# Patient Record
Sex: FEMALE | Race: BLACK OR AFRICAN AMERICAN | NOT HISPANIC OR LATINO | Employment: STUDENT | URBAN - METROPOLITAN AREA
[De-identification: names, ages, dates, MRNs, and addresses within clinical notes are randomized per-mention and may not be internally consistent; named-entity substitution may affect disease eponyms.]

---
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
## 2024-01-03 ENCOUNTER — OFFICE VISIT (OUTPATIENT)
Dept: FAMILY MEDICINE CLINIC | Facility: CLINIC | Age: 19
End: 2024-01-03
Payer: COMMERCIAL

## 2024-01-03 VITALS
HEART RATE: 89 BPM | SYSTOLIC BLOOD PRESSURE: 129 MMHG | OXYGEN SATURATION: 100 % | HEIGHT: 66 IN | BODY MASS INDEX: 37.12 KG/M2 | TEMPERATURE: 97.4 F | WEIGHT: 231 LBS | DIASTOLIC BLOOD PRESSURE: 70 MMHG

## 2024-01-03 DIAGNOSIS — E66.09 OBESITY DUE TO EXCESS CALORIES WITHOUT SERIOUS COMORBIDITY WITH BODY MASS INDEX (BMI) IN 95TH TO 98TH PERCENTILE FOR AGE IN PEDIATRIC PATIENT: ICD-10-CM

## 2024-01-03 DIAGNOSIS — B36.0 TINEA VERSICOLOR: ICD-10-CM

## 2024-01-03 DIAGNOSIS — N91.2 AMENORRHEA: ICD-10-CM

## 2024-01-03 DIAGNOSIS — Z00.00 WELL ADULT EXAM: Primary | ICD-10-CM

## 2024-01-03 PROCEDURE — 99395 PREV VISIT EST AGE 18-39: CPT | Performed by: FAMILY MEDICINE

## 2024-01-03 NOTE — PROGRESS NOTES
"Assessment/Plan: Anticipatory guidance provided.  Recommend lab testing below including thyroid check.  Recommend follow-up with gynecology and dermatology.  Anticipatory guidance provided.  Recommend good diet and regular exercise.     1. Well adult exam  -     CBC and differential  -     Comprehensive metabolic panel  -     Lipid Panel with Direct LDL reflex    2. Amenorrhea  -     Ambulatory Referral to Gynecology; Future  -     TSH, 3rd generation with Free T4 reflex; Future  -     Hemoglobin A1C    3. Tinea versicolor  -     Ambulatory Referral to Dermatology; Future    4. 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 female.    Patient here for well check.  She has a history of amenorrhea.  She would like to see a gynecologist.  Her last thyroid testing was borderline abnormal.  She should have this rechecked.  She also would like a referral to dermatology             The following portions of the patient's history were reviewed and updated as appropriate: allergies, current medications, past family history, past medical history, past social history, past surgical history, and problem list.    Review of Systems   Constitutional: Negative.    HENT: Negative.     Eyes: Negative.    Respiratory: Negative.     Cardiovascular: Negative.    Gastrointestinal: Negative.    Endocrine: Negative.    Genitourinary: Negative.    Musculoskeletal: Negative.    Skin: Negative.    Allergic/Immunologic: Negative.    Neurological: Negative.    Hematological: Negative.    Psychiatric/Behavioral: Negative.           Objective:      /70 (BP Location: Left arm, Patient Position: Sitting, Cuff Size: Large)   Pulse 89   Temp (!) 97.4 °F (36.3 °C) (Tympanic)   Ht 5' 6\" (1.676 m)   Wt 105 kg (231 lb)   LMP 07/18/2023   SpO2 100%   BMI 37.28 kg/m²          Physical " Exam  Vitals reviewed.   Constitutional:       Appearance: She is well-developed.   HENT:      Head: Normocephalic and atraumatic.      Right Ear: External ear normal. Tympanic membrane is not erythematous or bulging.      Left Ear: External ear normal. Tympanic membrane is not erythematous or bulging.      Nose: Nose normal.      Mouth/Throat:      Mouth: No oral lesions.      Pharynx: No oropharyngeal exudate.   Eyes:      General: No scleral icterus.        Right eye: No discharge.         Left eye: No discharge.      Conjunctiva/sclera: Conjunctivae normal.   Neck:      Thyroid: No thyromegaly.   Cardiovascular:      Rate and Rhythm: Normal rate and regular rhythm.      Heart sounds: Normal heart sounds. No murmur heard.     No friction rub. No gallop.   Pulmonary:      Effort: Pulmonary effort is normal. No respiratory distress.      Breath sounds: No wheezing or rales.   Chest:      Chest wall: No tenderness.   Abdominal:      General: Bowel sounds are normal. There is no distension.      Palpations: Abdomen is soft. There is no mass.      Tenderness: There is no abdominal tenderness. There is no guarding or rebound.   Musculoskeletal:         General: No tenderness or deformity. Normal range of motion.      Cervical back: Normal range of motion and neck supple.   Lymphadenopathy:      Cervical: No cervical adenopathy.   Skin:     General: Skin is warm and dry.      Coloration: Skin is not pale.      Findings: No erythema or rash.   Neurological:      Mental Status: She is alert and oriented to person, place, and time.      Cranial Nerves: No cranial nerve deficit.      Motor: No abnormal muscle tone.      Coordination: Coordination normal.      Deep Tendon Reflexes: Reflexes are normal and symmetric.   Psychiatric:         Behavior: Behavior normal.

## 2024-01-12 ENCOUNTER — APPOINTMENT (OUTPATIENT)
Dept: LAB | Age: 19
End: 2024-01-12
Payer: COMMERCIAL

## 2024-01-12 DIAGNOSIS — N91.2 AMENORRHEA: ICD-10-CM

## 2024-01-12 LAB
ALBUMIN SERPL BCP-MCNC: 4.1 G/DL (ref 3.5–5)
ALP SERPL-CCNC: 74 U/L (ref 34–104)
ALT SERPL W P-5'-P-CCNC: 15 U/L (ref 7–52)
ANION GAP SERPL CALCULATED.3IONS-SCNC: 8 MMOL/L
AST SERPL W P-5'-P-CCNC: 19 U/L (ref 13–39)
BASOPHILS # BLD AUTO: 0.07 THOUSANDS/ÂΜL (ref 0–0.1)
BASOPHILS NFR BLD AUTO: 1 % (ref 0–1)
BILIRUB SERPL-MCNC: 0.39 MG/DL (ref 0.2–1)
BUN SERPL-MCNC: 9 MG/DL (ref 5–25)
CALCIUM SERPL-MCNC: 9.6 MG/DL (ref 8.4–10.2)
CHLORIDE SERPL-SCNC: 104 MMOL/L (ref 96–108)
CHOLEST SERPL-MCNC: 165 MG/DL
CO2 SERPL-SCNC: 25 MMOL/L (ref 21–32)
CREAT SERPL-MCNC: 0.68 MG/DL (ref 0.6–1.3)
EOSINOPHIL # BLD AUTO: 0.16 THOUSAND/ÂΜL (ref 0–0.61)
EOSINOPHIL NFR BLD AUTO: 2 % (ref 0–6)
ERYTHROCYTE [DISTWIDTH] IN BLOOD BY AUTOMATED COUNT: 16 % (ref 11.6–15.1)
GFR SERPL CREATININE-BSD FRML MDRD: 127 ML/MIN/1.73SQ M
GLUCOSE P FAST SERPL-MCNC: 82 MG/DL (ref 65–99)
HCT VFR BLD AUTO: 39.1 % (ref 34.8–46.1)
HDLC SERPL-MCNC: 34 MG/DL
HGB BLD-MCNC: 12.2 G/DL (ref 11.5–15.4)
IMM GRANULOCYTES # BLD AUTO: 0.05 THOUSAND/UL (ref 0–0.2)
IMM GRANULOCYTES NFR BLD AUTO: 1 % (ref 0–2)
LDLC SERPL CALC-MCNC: 114 MG/DL (ref 0–100)
LYMPHOCYTES # BLD AUTO: 2.46 THOUSANDS/ÂΜL (ref 0.6–4.47)
LYMPHOCYTES NFR BLD AUTO: 30 % (ref 14–44)
MCH RBC QN AUTO: 24.8 PG (ref 26.8–34.3)
MCHC RBC AUTO-ENTMCNC: 31.2 G/DL (ref 31.4–37.4)
MCV RBC AUTO: 80 FL (ref 82–98)
MONOCYTES # BLD AUTO: 0.55 THOUSAND/ÂΜL (ref 0.17–1.22)
MONOCYTES NFR BLD AUTO: 7 % (ref 4–12)
NEUTROPHILS # BLD AUTO: 4.88 THOUSANDS/ÂΜL (ref 1.85–7.62)
NEUTS SEG NFR BLD AUTO: 59 % (ref 43–75)
NRBC BLD AUTO-RTO: 0 /100 WBCS
PLATELET # BLD AUTO: 409 THOUSANDS/UL (ref 149–390)
PMV BLD AUTO: 10.2 FL (ref 8.9–12.7)
POTASSIUM SERPL-SCNC: 3.9 MMOL/L (ref 3.5–5.3)
PROT SERPL-MCNC: 8 G/DL (ref 6.4–8.4)
RBC # BLD AUTO: 4.91 MILLION/UL (ref 3.81–5.12)
SODIUM SERPL-SCNC: 137 MMOL/L (ref 135–147)
TRIGL SERPL-MCNC: 86 MG/DL
WBC # BLD AUTO: 8.17 THOUSAND/UL (ref 4.31–10.16)

## 2024-01-12 PROCEDURE — 85025 COMPLETE CBC W/AUTO DIFF WBC: CPT | Performed by: FAMILY MEDICINE

## 2024-01-12 PROCEDURE — 84443 ASSAY THYROID STIM HORMONE: CPT

## 2024-01-12 PROCEDURE — 83036 HEMOGLOBIN GLYCOSYLATED A1C: CPT | Performed by: FAMILY MEDICINE

## 2024-01-12 PROCEDURE — 80053 COMPREHEN METABOLIC PANEL: CPT | Performed by: FAMILY MEDICINE

## 2024-01-12 PROCEDURE — 80061 LIPID PANEL: CPT | Performed by: FAMILY MEDICINE

## 2024-01-13 LAB
EST. AVERAGE GLUCOSE BLD GHB EST-MCNC: 120 MG/DL
HBA1C MFR BLD: 5.8 %
T3FREE SERPL-MCNC: 3.41 PG/ML (ref 2.5–3.9)
T4 FREE SERPL-MCNC: 1.06 NG/DL (ref 0.61–1.12)
THYROGLOB AB SERPL-ACNC: <1 IU/ML (ref 0–0.9)
THYROGLOB AB SERPL-ACNC: <1 IU/ML (ref 0–0.9)
THYROGLOB SERPL-MCNC: 7.7 NG/ML (ref 1.5–38.5)
THYROPEROXIDASE AB SERPL-ACNC: 12 IU/ML (ref 0–26)
TSH SERPL DL<=0.05 MIU/L-ACNC: 2.31 UIU/ML (ref 0.45–4.5)

## 2024-03-15 NOTE — PROGRESS NOTES
Assessment/Plan:  Problem List Items Addressed This Visit          Obstetrics/Gynecology    Amenorrhea    Relevant Medications    medroxyPROGESTERone (PROVERA) 10 mg tablet    Other Relevant Orders    hCG, quantitative    DHEA-sulfate    Follicle stimulating hormone    TSH, 3rd generation with Free T4 reflex    Testosterone, free, total    Prolactin    US pelvis transabdominal only    17-Hydroxyprogesterone    POCT urine HCG (Completed)     Other Visit Diagnoses       Secondary oligomenorrhea    -  Primary    Relevant Orders    hCG, quantitative    DHEA-sulfate    Follicle stimulating hormone    TSH, 3rd generation with Free T4 reflex    Testosterone, free, total    Prolactin    US pelvis transabdominal only    17-Hydroxyprogesterone    POCT urine HCG (Completed)           With amenorrhea.  Patient previously prescribed Provera every 3 months as needed.  Advised patient repeat labs and ultrasound to rule out PCOS.  Advised to return in 2 weeks for follow-up.      Subjective   Patient ID: Chanell Smart is a 18 y.o. female.    Patient is here for a problem visit.    Chief Complaint   Patient presents with    Follow-up     Haven't had period in 7 months.      She had regular periods until age 15.  She would only get her periods. She states she would skip a period for up to 6 months.  She was seen here 3 years ago and was given Provera.    She has not gotten had a period since 2023.  She only had 4 periods last year.    Pelvic US in  was normal.  Labs in  normal.    She has had some changes in weight, now up to 10-15 lbs over the last yeat.  She reports no facial hair.  NO acne.  No dark skin around neck or armpit.    She has never been sexually active.        Menstrual History:  OB History          0    Para   0    Term   0       0    AB   0    Living   0         SAB   0    IAB   0    Ectopic   0    Multiple   0    Live Births   0                Menarche age: 10  Patient's last menstrual period  "was 08/15/2023 (exact date).         History reviewed. No pertinent past medical history.    History reviewed. No pertinent surgical history.    Social History     Tobacco Use    Smoking status: Never    Smokeless tobacco: Never   Vaping Use    Vaping status: Never Used   Substance Use Topics    Alcohol use: No    Drug use: No        Allergies   Allergen Reactions    Chocolate - Food Allergy      Breaks out in rash         Current Outpatient Medications:     medroxyPROGESTERone (PROVERA) 10 mg tablet, Take 1 tablet (10 mg total) by mouth daily for 10 days Take if no spontaneous menstrual cycle in 3 months, Disp: 10 tablet, Rfl: 4      Review of Systems   Constitutional: Negative.    HENT: Negative.     Eyes: Negative.    Respiratory: Negative.     Cardiovascular: Negative.    Gastrointestinal: Negative.    Endocrine: Negative.    Genitourinary:         As noted in HPI   Musculoskeletal: Negative.    Skin: Negative.    Allergic/Immunologic: Negative.    Neurological: Negative.    Hematological: Negative.    Psychiatric/Behavioral: Negative.           /62 (BP Location: Right arm, Patient Position: Sitting, Cuff Size: Large)   Pulse 79   Ht 5' 6\" (1.676 m)   Wt 107 kg (236 lb 12.8 oz)   LMP 08/15/2023 (Exact Date)   SpO2 97%   BMI 38.22 kg/m²       Physical Exam  Constitutional:       General: She is not in acute distress.     Appearance: She is well-developed.   Cardiovascular:      Rate and Rhythm: Normal rate.      Pulses: Normal pulses.   Neurological:      Mental Status: She is alert and oriented to person, place, and time.   Skin:     General: Skin is warm and dry.   Psychiatric:         Attention and Perception: Attention normal.         Mood and Affect: Mood normal.         Speech: Speech normal.         Behavior: Behavior normal. Behavior is cooperative.             I have spent a total time of 20 minutes on 03/18/24 in caring for this patient including Diagnostic results, Prognosis, Risks and " benefits of tx options, Instructions for management, Patient and family education, Importance of tx compliance, Risk factor reductions, Impressions, Counseling / Coordination of care, Documenting in the medical record, Reviewing / ordering tests, medicine, procedures  , Obtaining or reviewing history  , and Communicating with other healthcare professionals .       Future Appointments   Date Time Provider Department Center   4/3/2024 10:45 AM Brianna Mg MD [facility]

## 2024-03-18 ENCOUNTER — OFFICE VISIT (OUTPATIENT)
Dept: OBGYN CLINIC | Facility: CLINIC | Age: 19
End: 2024-03-18
Payer: COMMERCIAL

## 2024-03-18 ENCOUNTER — LAB (OUTPATIENT)
Dept: LAB | Facility: CLINIC | Age: 19
End: 2024-03-18
Payer: COMMERCIAL

## 2024-03-18 VITALS
OXYGEN SATURATION: 97 % | BODY MASS INDEX: 38.06 KG/M2 | SYSTOLIC BLOOD PRESSURE: 108 MMHG | HEART RATE: 79 BPM | HEIGHT: 66 IN | DIASTOLIC BLOOD PRESSURE: 62 MMHG | WEIGHT: 236.8 LBS

## 2024-03-18 DIAGNOSIS — N91.4 SECONDARY OLIGOMENORRHEA: Primary | ICD-10-CM

## 2024-03-18 DIAGNOSIS — N91.4 SECONDARY OLIGOMENORRHEA: ICD-10-CM

## 2024-03-18 DIAGNOSIS — N91.2 AMENORRHEA: ICD-10-CM

## 2024-03-18 LAB
B-HCG SERPL-ACNC: 1 MIU/ML (ref 0–5)
FSH SERPL-ACNC: 7.5 MIU/ML
PROLACTIN SERPL-MCNC: 11.82 NG/ML (ref 3.34–26.72)
SL AMB POCT URINE HCG: NEGATIVE
TSH SERPL DL<=0.05 MIU/L-ACNC: 2.25 UIU/ML (ref 0.45–4.5)

## 2024-03-18 PROCEDURE — 36415 COLL VENOUS BLD VENIPUNCTURE: CPT

## 2024-03-18 PROCEDURE — 99213 OFFICE O/P EST LOW 20 MIN: CPT | Performed by: OBSTETRICS & GYNECOLOGY

## 2024-03-18 PROCEDURE — 83001 ASSAY OF GONADOTROPIN (FSH): CPT

## 2024-03-18 PROCEDURE — 84702 CHORIONIC GONADOTROPIN TEST: CPT

## 2024-03-18 PROCEDURE — 84402 ASSAY OF FREE TESTOSTERONE: CPT

## 2024-03-18 PROCEDURE — 84443 ASSAY THYROID STIM HORMONE: CPT

## 2024-03-18 PROCEDURE — 84146 ASSAY OF PROLACTIN: CPT

## 2024-03-18 PROCEDURE — 84403 ASSAY OF TOTAL TESTOSTERONE: CPT

## 2024-03-18 PROCEDURE — 82627 DEHYDROEPIANDROSTERONE: CPT

## 2024-03-18 PROCEDURE — 81025 URINE PREGNANCY TEST: CPT | Performed by: OBSTETRICS & GYNECOLOGY

## 2024-03-18 PROCEDURE — 83498 ASY HYDROXYPROGESTERONE 17-D: CPT

## 2024-03-18 RX ORDER — MEDROXYPROGESTERONE ACETATE 10 MG/1
10 TABLET ORAL DAILY
Qty: 10 TABLET | Refills: 4 | Status: SHIPPED | OUTPATIENT
Start: 2024-03-18 | End: 2024-03-28

## 2024-03-19 LAB
DHEA-S SERPL-MCNC: 161 UG/DL (ref 110–433.2)
TESTOST FREE SERPL-MCNC: 3.3 PG/ML
TESTOST SERPL-MCNC: 47 NG/DL (ref 13–71)

## 2024-03-21 LAB — 17OHP SERPL-MCNC: 28 NG/DL

## 2024-03-28 ENCOUNTER — HOSPITAL ENCOUNTER (OUTPATIENT)
Dept: ULTRASOUND IMAGING | Facility: HOSPITAL | Age: 19
Discharge: HOME | End: 2024-03-28
Attending: OBSTETRICS & GYNECOLOGY
Payer: COMMERCIAL

## 2024-03-28 DIAGNOSIS — N91.4 SECONDARY OLIGOMENORRHEA: ICD-10-CM

## 2024-03-28 DIAGNOSIS — N91.2 AMENORRHEA: ICD-10-CM

## 2024-03-28 PROCEDURE — 76856 US EXAM PELVIC COMPLETE: CPT

## 2024-04-02 NOTE — PROGRESS NOTES
Assessment/Plan:  Problem List Items Addressed This Visit          Obstetrics/Gynecology    Amenorrhea - Primary     Other Visit Diagnoses       Secondary oligomenorrhea        Anovulation                 Patient with anovulatory cycles.  No clear diagnosis of PCOS is no cysts noted on ultrasound although formal reading is pending.  Discussed normal testosterone levels without evidence of other hormonal abnormalities.  She does not currently require contraception.  Discussed with patient cyclic progesterone withdrawal to help prevent endometrial hyperplasia and advised to call if with no menses after this cycle of progesterone.  Follow-up in 1 year for medication check and follow-up of abnormal periods. Discussed with patient blood work results in detail    Subjective   Patient ID: Chanell Smart is a 18 y.o. female.    Patient is here for a ff up/results discussion    Chief Complaint   Patient presents with    Follow-up     Discuss blood work. No concerns     Provera started 3 days ago.      Menstrual History:  OB History          0    Para   0    Term   0       0    AB   0    Living   0         SAB   0    IAB   0    Ectopic   0    Multiple   0    Live Births   0                  Patient's last menstrual period was 08/15/2023 (exact date).         History reviewed. No pertinent past medical history.    History reviewed. No pertinent surgical history.    Social History     Tobacco Use    Smoking status: Never    Smokeless tobacco: Never   Vaping Use    Vaping status: Never Used   Substance Use Topics    Alcohol use: No    Drug use: No        Allergies   Allergen Reactions    Chocolate - Food Allergy      Breaks out in rash         Current Outpatient Medications:     medroxyPROGESTERone (PROVERA) 10 mg tablet, Take 1 tablet (10 mg total) by mouth daily for 10 days Take if no spontaneous menstrual cycle in 3 months, Disp: 10 tablet, Rfl: 4      Review of Systems   Constitutional: Negative.    HENT:  "Negative.     Eyes: Negative.    Respiratory: Negative.     Cardiovascular: Negative.    Gastrointestinal: Negative.    Endocrine: Negative.    Genitourinary:         As noted in HPI   Musculoskeletal: Negative.    Skin: Negative.    Allergic/Immunologic: Negative.    Neurological: Negative.    Hematological: Negative.    Psychiatric/Behavioral: Negative.           /78 (BP Location: Right arm, Patient Position: Sitting, Cuff Size: Adult)   Pulse 78   Ht 5' 6\" (1.676 m)   Wt 103 kg (226 lb 12.8 oz)   LMP 08/15/2023 (Exact Date)   SpO2 98%   BMI 36.61 kg/m²       Physical Exam  Constitutional:       General: She is not in acute distress.     Appearance: She is well-developed.   Cardiovascular:      Rate and Rhythm: Normal rate.      Pulses: Normal pulses.   Neurological:      Mental Status: She is alert and oriented to person, place, and time.   Skin:     General: Skin is warm and dry.   Psychiatric:         Attention and Perception: Attention normal.         Mood and Affect: Mood normal.         Speech: Speech normal.         Behavior: Behavior normal. Behavior is cooperative.             Future Appointments   Date Time Provider Department Center   10/31/2024  4:00 PM Toyin Sprague MD [facility]   4/3/2025  3:15 PM Brianna Mg MD [facility]      "

## 2024-04-03 ENCOUNTER — OFFICE VISIT (OUTPATIENT)
Dept: OBGYN CLINIC | Facility: CLINIC | Age: 19
End: 2024-04-03
Payer: COMMERCIAL

## 2024-04-03 VITALS
OXYGEN SATURATION: 98 % | BODY MASS INDEX: 36.45 KG/M2 | HEART RATE: 78 BPM | DIASTOLIC BLOOD PRESSURE: 78 MMHG | HEIGHT: 66 IN | SYSTOLIC BLOOD PRESSURE: 116 MMHG | WEIGHT: 226.8 LBS

## 2024-04-03 DIAGNOSIS — N91.2 AMENORRHEA: Primary | ICD-10-CM

## 2024-04-03 DIAGNOSIS — N91.4 SECONDARY OLIGOMENORRHEA: ICD-10-CM

## 2024-04-03 DIAGNOSIS — N97.0 ANOVULATION: ICD-10-CM

## 2024-04-03 PROCEDURE — 99213 OFFICE O/P EST LOW 20 MIN: CPT | Performed by: OBSTETRICS & GYNECOLOGY

## 2024-08-07 ENCOUNTER — HOSPITAL ENCOUNTER (EMERGENCY)
Facility: HOSPITAL | Age: 19
Discharge: HOME | End: 2024-08-07
Attending: INTERNAL MEDICINE
Payer: COMMERCIAL

## 2024-08-07 VITALS
OXYGEN SATURATION: 100 % | WEIGHT: 231.7 LBS | RESPIRATION RATE: 18 BRPM | HEART RATE: 93 BPM | TEMPERATURE: 97.8 F | DIASTOLIC BLOOD PRESSURE: 76 MMHG | SYSTOLIC BLOOD PRESSURE: 147 MMHG | BODY MASS INDEX: 37.4 KG/M2

## 2024-08-07 DIAGNOSIS — M79.646 THUMB PAIN: Primary | ICD-10-CM

## 2024-08-07 PROCEDURE — 99282 EMERGENCY DEPT VISIT SF MDM: CPT

## 2024-08-07 PROCEDURE — 99284 EMERGENCY DEPT VISIT MOD MDM: CPT | Performed by: PHYSICIAN ASSISTANT

## 2024-08-07 RX ORDER — NAPROXEN 500 MG/1
500 TABLET ORAL 2 TIMES DAILY PRN
Qty: 20 TABLET | Refills: 0 | Status: SHIPPED | OUTPATIENT
Start: 2024-08-07

## 2024-08-07 NOTE — Clinical Note
Chanell Smart was seen and treated in our emergency department on 8/7/2024.                Diagnosis:     Chanell  .    She may return on this date: 08/11/2024    Light duty, no heavy lifting until pain improves or until cleared.      If you have any questions or concerns, please don't hesitate to call.      Bill Henriquez PA-C    ______________________________           _______________          _______________  Hospital Representative                              Date                                Time

## 2024-08-07 NOTE — ED PROVIDER NOTES
History  Chief Complaint   Patient presents with    Thumb Pain     Pt c/o right thumb pain for 2 days unknown if injury occurred      Chanell is a 17 yo F presenting with pain to base of R thumb for the past two days or so. No specific preceding injury/trauma. Notes the pain worsens with certain movements including both flexion and extension of thumb. No swelling appreciable. No meds PTA for symptoms.       History provided by:  Patient   used: No        Prior to Admission Medications   Prescriptions Last Dose Informant Patient Reported? Taking?   medroxyPROGESTERone (PROVERA) 10 mg tablet   No No   Sig: Take 1 tablet (10 mg total) by mouth daily for 10 days Take if no spontaneous menstrual cycle in 3 months      Facility-Administered Medications: None       History reviewed. No pertinent past medical history.    History reviewed. No pertinent surgical history.    Family History   Problem Relation Age of Onset    No Known Problems Mother     No Known Problems Father     No Known Problems Sister     No Known Problems Brother     No Known Problems Maternal Grandmother     No Known Problems Maternal Grandfather     Heart disease Paternal Grandmother     No Known Problems Paternal Grandfather      I have reviewed and agree with the history as documented.    E-Cigarette/Vaping    E-Cigarette Use Never User      E-Cigarette/Vaping Substances    Nicotine No     THC No     CBD No     Flavoring No     Other No     Unknown No      Social History     Tobacco Use    Smoking status: Never    Smokeless tobacco: Never   Vaping Use    Vaping status: Never Used   Substance Use Topics    Alcohol use: No    Drug use: No       Review of Systems   Constitutional:  Negative for chills and fever.   HENT:  Negative for congestion, rhinorrhea and sore throat.    Eyes:  Negative for pain and visual disturbance.   Respiratory:  Negative for cough, shortness of breath and wheezing.    Cardiovascular:  Negative for chest pain  and palpitations.   Gastrointestinal:  Negative for abdominal pain, nausea and vomiting.   Genitourinary:  Negative for dysuria, frequency and urgency.   Musculoskeletal:  Positive for arthralgias. Negative for back pain, joint swelling, neck pain and neck stiffness.   Skin:  Negative for rash and wound.   Neurological:  Negative for dizziness, weakness, light-headedness and numbness.       Physical Exam  Physical Exam  Constitutional:       General: She is not in acute distress.     Appearance: She is well-developed. She is not diaphoretic.   HENT:      Head: Normocephalic and atraumatic.      Right Ear: External ear normal.      Left Ear: External ear normal.   Eyes:      Extraocular Movements:      Right eye: Normal extraocular motion.      Left eye: Normal extraocular motion.      Conjunctiva/sclera: Conjunctivae normal.      Pupils: Pupils are equal, round, and reactive to light.   Cardiovascular:      Rate and Rhythm: Normal rate and regular rhythm.   Pulmonary:      Effort: Pulmonary effort is normal. No accessory muscle usage or respiratory distress.   Abdominal:      General: Abdomen is flat. There is no distension.   Musculoskeletal:         General: Tenderness present.      Cervical back: Normal range of motion. No rigidity.      Comments: TTP along base of thumb, both over musculature of thenar eminence as well as to extensor tendons of thumb. +Finkelstein's. Also notes pain with passive abduction of thumb however. Intact cap refill/sensation distally.    Skin:     General: Skin is warm and dry.      Capillary Refill: Capillary refill takes less than 2 seconds.      Findings: No erythema or rash.   Neurological:      Mental Status: She is alert and oriented to person, place, and time.      Motor: No abnormal muscle tone.      Coordination: Coordination normal.   Psychiatric:         Behavior: Behavior normal.         Thought Content: Thought content normal.         Judgment: Judgment normal.  "        Vital Signs  ED Triage Vitals [08/07/24 1013]   Temperature Pulse Respirations Blood Pressure SpO2   97.8 °F (36.6 °C) 93 18 147/76 100 %      Temp src Heart Rate Source Patient Position - Orthostatic VS BP Location FiO2 (%)   -- -- Sitting Right arm --      Pain Score       --           Vitals:    08/07/24 1013   BP: 147/76   Pulse: 93   Patient Position - Orthostatic VS: Sitting         Visual Acuity      ED Medications  Medications - No data to display    Diagnostic Studies  Results Reviewed       None                   No orders to display              Procedures  Procedures         ED Course         CRAFFT      Flowsheet Row Most Recent Value   CRAFFT Initial Screen: During the past 12 months, did you:    1. Drink any alcohol (more than a few sips)?  No Filed at: 08/07/2024 1013   2. Smoke any marijuana or hashish No Filed at: 08/07/2024 1013   3. Use anything else to get high? (\"anything else\" includes illegal drugs, over the counter and prescription drugs, and things that you sniff or 'daily')? No Filed at: 08/07/2024 1013                                              Medical Decision Making  Atraumatic pain to base of R thumb over the past two days. No swelling on exam. Pain worse with movements. She has TTP to base of thumb both over thenar eminence but also to extensor tendons of thumb. Ddx including Dequervain's/alternate tendonitis, sprain although unlikely given absence of known trauma. Thumb spica brace applied and well tolerated, will refer for f/u with ortho hand for re-evaluation.     Risk  Prescription drug management.                 Disposition  Final diagnoses:   Thumb pain     Time reflects when diagnosis was documented in both MDM as applicable and the Disposition within this note       Time User Action Codes Description Comment    8/7/2024 11:15 AM Bill Henriquez Add [M79.646] Thumb pain           ED Disposition       ED Disposition   Discharge    Condition   Stable    Date/Time "   Wed Aug 7, 2024 1115    Comment   Chanell Swensongabriele discharge to home/self care.                   Follow-up Information       Follow up With Specialties Details Why Contact Info Additional Information    [facility] Emergency Medicine  If symptoms worsen [address] [facility] [address]  [phone] [facility] Emergency Department, [address], [place], [address]    [facility] [place] [facility] Schedule an appointment as soon as possible for a visit   [address]  [address]  [facility] [address] [phone] [facility] [place], [address], [address], [place], [place], [address] [phone]            Discharge Medication List as of 8/7/2024 11:17 AM        START taking these medications    Details   naproxen (NAPROSYN) 500 mg tablet Take 1 tablet (500 mg total) by mouth 2 (two) times a day as needed for mild pain or moderate pain, Starting Wed 8/7/2024, Normal           CONTINUE these medications which have NOT CHANGED    Details   medroxyPROGESTERone (PROVERA) 10 mg tablet Take 1 tablet (10 mg total) by mouth daily for 10 days Take if no spontaneous menstrual cycle in 3 months, Starting Mon 3/18/2024, Until Thu 3/28/2024, Normal                 PDMP Review       None            ED Provider  Electronically Signed by             Bill Henriquez PA-C  08/07/24 2640

## 2024-08-07 NOTE — DISCHARGE INSTRUCTIONS
Please refer to the attached information for strict return instructions. If symptoms worsen or new symptoms develop please return to the ER. Please follow up with orthopedics if symptoms persist.

## 2024-09-16 ENCOUNTER — TELEPHONE (OUTPATIENT)
Dept: DERMATOLOGY | Facility: CLINIC | Age: 19
End: 2024-09-16

## 2024-09-16 NOTE — TELEPHONE ENCOUNTER
Called patient regarding her appt with Dr. Sprague on 10/31. There was a change to provider's schedule an appt needs to be r/s. Spoke with patient and appt has been rescheduled for 12/17 @ 4:00 with Dr. Sprague in [facility] office.

## 2024-11-13 ENCOUNTER — OFFICE VISIT (OUTPATIENT)
Dept: FAMILY MEDICINE CLINIC | Facility: CLINIC | Age: 19
End: 2024-11-13
Payer: COMMERCIAL

## 2024-11-13 VITALS
WEIGHT: 231 LBS | DIASTOLIC BLOOD PRESSURE: 84 MMHG | TEMPERATURE: 98.2 F | OXYGEN SATURATION: 100 % | HEART RATE: 83 BPM | HEIGHT: 66 IN | BODY MASS INDEX: 37.12 KG/M2 | SYSTOLIC BLOOD PRESSURE: 138 MMHG

## 2024-11-13 DIAGNOSIS — N91.2 AMENORRHEA: Primary | ICD-10-CM

## 2024-11-13 DIAGNOSIS — H52.13 MYOPIA OF BOTH EYES: ICD-10-CM

## 2024-11-13 DIAGNOSIS — Z00.00 HEALTHCARE MAINTENANCE: ICD-10-CM

## 2024-11-13 PROCEDURE — 99213 OFFICE O/P EST LOW 20 MIN: CPT | Performed by: FAMILY MEDICINE

## 2024-11-15 NOTE — PROGRESS NOTES
"Assessment/Plan: Referrals provided as requested.  Anticipatory guidance provided.  Continue follow-up with gynecology.  Recommend recheck again in 1 year or sooner if needed.  Recommend good diet and regular exercise.     1. Amenorrhea  Comments:  Referral provided to endocrinology as requested.  Orders:  -     Ambulatory Referral to Endocrinology; Future  2. Myopia of both eyes  Comments:  Referral provided to ophthalmology as requested.  Orders:  -     Ambulatory Referral to Ophthalmology; Future  3. Healthcare maintenance  Comments:  Referral to dentist provided as requested.  Orders:  -     Ambulatory Referral to Dentistry; Future        Subjective:      Patient ID: Chanell Smart is a 18 y.o. female.    Patient seen today for request for referrals.  She has history of amenorrhea and continues to follow with gynecologist.  Gynecologist had recommended follow-up with endocrinology.  Patient is also requesting a dental referral and a referral for ophthalmology.  She has seen optometrist in the past and would like to see an ophthalmologist for history of myopia.  No other recent visual changes.  She has otherwise been feeling well.             The following portions of the patient's history were reviewed and updated as appropriate: allergies, current medications, past family history, past medical history, past social history, past surgical history, and problem list.    Review of Systems   Constitutional: Negative.    HENT: Negative.     Eyes: Negative.    Respiratory: Negative.     Cardiovascular: Negative.    Gastrointestinal: Negative.    Endocrine: Negative.    Genitourinary: Negative.    Musculoskeletal: Negative.    Skin: Negative.    Allergic/Immunologic: Negative.    Neurological: Negative.    Hematological: Negative.    Psychiatric/Behavioral: Negative.           Objective:      /84 (BP Location: Left arm, Patient Position: Sitting)   Pulse 83   Temp 98.2 °F (36.8 °C)   Ht 5' 6\" (1.676 m)   Wt 105 " kg (231 lb)   SpO2 100%   BMI 37.28 kg/m²          Physical Exam  Constitutional:       General: She is not in acute distress.     Appearance: She is well-developed. She is not diaphoretic.   Neurological:      Mental Status: She is alert and oriented to person, place, and time.   Psychiatric:         Behavior: Behavior normal.         Thought Content: Thought content normal.         Judgment: Judgment normal.

## 2024-12-03 ENCOUNTER — TELEPHONE (OUTPATIENT)
Age: 19
End: 2024-12-03

## 2024-12-03 DIAGNOSIS — N91.2 AMENORRHEA: Primary | ICD-10-CM

## 2024-12-03 NOTE — TELEPHONE ENCOUNTER
Pt has an Endo appt coming up and needs the following labs ordered. Testosterone, free & total DHEA-sulfate.    Can pt be contacted once available

## 2024-12-17 ENCOUNTER — OFFICE VISIT (OUTPATIENT)
Dept: DERMATOLOGY | Facility: CLINIC | Age: 19
End: 2024-12-17
Payer: COMMERCIAL

## 2024-12-17 VITALS — TEMPERATURE: 97.1 F | WEIGHT: 235 LBS | BODY MASS INDEX: 37.93 KG/M2

## 2024-12-17 DIAGNOSIS — L83 CONFLUENT AND RETICULATED PAPILLOMATOSIS (CARP): Primary | ICD-10-CM

## 2024-12-17 DIAGNOSIS — B36.0 TINEA VERSICOLOR: ICD-10-CM

## 2024-12-17 PROCEDURE — 99244 OFF/OP CNSLTJ NEW/EST MOD 40: CPT | Performed by: DERMATOLOGY

## 2024-12-17 RX ORDER — AMOXICILLIN 250 MG/1
CAPSULE ORAL
Qty: 90 CAPSULE | Refills: 1 | Status: SHIPPED | OUTPATIENT
Start: 2024-12-17 | End: 2025-02-17

## 2024-12-17 NOTE — PROGRESS NOTES
"[facility] Dermatology Clinic Note     Patient Name: Chanell Smart  Encounter Date: 12/17/24     Have you been cared for by a [facility] Dermatologist in the last 3 years and, if so, which description applies to you?    NO.   I am considered a \"new\" patient and must complete all patient intake questions. I am FEMALE/of child-bearing potential.    REVIEW OF SYSTEMS:  Have you recently had or currently have any of the following? Recent fever or chills? No  Any non-healing wound? No  Are you pregnant or planning to become pregnant? No  Are you currently or planning to be nursing or breast feeding? No   PAST MEDICAL HISTORY:  Have you personally ever had or currently have any of the following?  If \"YES,\" then please provide more detail. Skin cancer (such as Melanoma, Basal Cell Carcinoma, Squamous Cell Carcinoma?  No  Tuberculosis, HIV/AIDS, Hepatitis B or C: No  Radiation Treatment No   HISTORY OF IMMUNOSUPPRESSION:   Do you have a history of any of the following:  Systemic Immunosuppression such as Diabetes, Biologic or Immunotherapy, Chemotherapy, Organ Transplantation, Bone Marrow Transplantation or Prednsione?  No    Answering \"YES\" requires the addition of the dotphrase \"IMMUNOSUPPRESSED\" as the first diagnosis of the patient's visit.   FAMILY HISTORY:  Any \"first degree relatives\" (parent, brother, sister, or child) with the following?    Skin Cancer, Pancreatic or Other Cancer? No   PATIENT EXPERIENCE:    Do you want the Dermatologist to perform a COMPLETE skin exam today including a clinical examination under the \"bra and underwear\" areas?  NO  If necessary, do we have your permission to call and leave a detailed message on your Preferred Phone number that includes your specific medical information?  Yes      Allergies   Allergen Reactions    Chocolate - Food Allergy      Breaks out in rash      Current Outpatient Medications:     naproxen (NAPROSYN) 500 mg tablet, Take 1 tablet (500 mg total) by mouth 2 (two) " times a day as needed for mild pain or moderate pain, Disp: 20 tablet, Rfl: 0    medroxyPROGESTERone (PROVERA) 10 mg tablet, Take 1 tablet (10 mg total) by mouth daily for 10 days Take if no spontaneous menstrual cycle in 3 months (Patient not taking: Reported on 12/17/2024), Disp: 10 tablet, Rfl: 4          Whom besides the patient is providing clinical information about today's encounter?   NO ADDITIONAL HISTORIAN (patient alone provided history)    Physical Exam and Assessment/Plan by Diagnosis:    CONFLUENT AND RETICULATED PAPILLOMATOSIS (CARP)    Physical Exam:  Anatomic Location Affected:  chest and back  Morphological Description: reticulated hyperpigmented velvety plaques on chest, upper back  Pertinent Positives:  Pruritic   Pertinent Negatives:    Additional History of Present Condition:  Present for about 6 years. Was previously diagnosed with tinea versicolor and prescribed unknown oral medication. Medication caused nausea and vomiting.     Assessment and Plan:  Based on a thorough discussion of this condition and the management approach to it (including a comprehensive discussion of the known risks, side effects and potential benefits of treatment), the patient (family) agrees to implement the following specific plan:  Discussed that CARP is favored over tinea versicolor, but biopsy would be needed for definite diagnosis. Offered biopsy vs trial of therapy with close follow up. She deferred biopsy for now. Will try medication, but if no improvement, discussed possible biopsy at follow up visit.  Patient has concerns about GI upset so avoiding tetracyclines which would be standard for CARP  Take Amoxicillin 250 mg capsule three times a day for two months (per uptodate review)  Take a probiotic while on the Amoxicillin, such as yogurt or oral probiotics  Follow up in 6-8 weeks  If not improved, recommend biopsy    What is confluent and reticulated papillomatosis?  Confluent and reticulated papillomatosis  is an uncommon skin condition affecting the trunk, neck and axillae. It is characterised by asymptomatic, hyperpigmented papules and plaques that have a peripheral, net-like configuration. [place] dermatologists Edvin and Angela first described confluent and reticulated papillomatosis in 1927.    Who gets confluent and reticulated papillomatosis?   Confluent and reticulated papillomatosis mostly occurs in young adults. The mean age of incidence is 15 years, with a range of 8-32 years. It has been reported worldwide in all racial groups and ethnicities, but it is reported to be most common in Caucasians.    What is the cause of confluent and reticulated papillomatosis?  What triggers confluent and reticulated papillomatosis remains unclear. Disordered and hyperproliferative keratinisation has been observed on light and electron microscopy. The possibility that this is due to skin infection is supported by the success of treatment with antibiotics.  Dietzia papillomatosis is the current leading infectious candidate. This is a gram-positive actinomycete that was first isolated from a patient with confluent and reticulated papillomatosis in 2005.   Earlier observations that antifungal agents were effective, supporting the role of Malassezia furfur in the pathogenesis, were likely due to misdiagnosis of pityriasis versicolor.  Genetic factors may also be involved.  Several cases of familial confluent and reticulated papillomatosis have been reported.   Mutation of the gene for the protein keratin-16 (K-16) has been found in some cases.  Insulin resistance, which causes diabetes mellitus and polycystic ovarian syndrome, has been suggested as a contributing factor to confluent and reticulated papillomatosis, but evidence is weak, as most affected patients do not have metabolic syndrome. Amyloidosis and reaction to ultraviolet light have also been considered but are unlikely causes.    What are the clinical features of  confluent and reticulated papillomatosis?  Confluent and reticulated papillomatosis is characterised by multiple 1-5 mm, hyperpigmented, scaly macules or papillomatous papules.  These often form confluent patches or plaques centrally, and a reticular pattern peripherally.   They most commonly occur on the upper trunk, neck and axillae. They may also extend anteriorly down to the pubic region and posteriorly to the  cleft. The antecubital and popliteal fossae and forehead are rarely affected.   The eruption does not involve mucosal surfaces or nails.   Lesions are asymptomatic or mildly itchy.     What is the treatment for confluent and reticulated papillomatosis?  Confluent and reticulated papillomatosis usually clears with a tetracycline (minocycline, doxycycline for 6-12 weeks) or a macrolide antibiotic (azithromycin, clarithromycin, erythromycin for 4-6 weeks). Azithromycin and erythromycin can be prescribed in pregnancy. Topical tazarotene (a topical retinoid), tacrolimus and calcipotriol) may be used for localised disease. Systematic retinoids (isotretinoin and acitretin) are usually reserved for cases refractory to antibiotics and topical agents.    What is the outlook for confluent and reticulated papillomatosis?  A single course of minocycline or a macrolide antibiotic is reported to lead to remission for up to 2 years in many cases of confluent and reticulated papillomatosis. Recurrence in up to 15% of cases usually follows non-antibacterial treatment. Spontaneous resolution has been reported in a few cases but it may take up to 39 months for the eruption to clear up.     Scribe Attestation      I,:  Fanny Booth am acting as a scribe while in the presence of the attending physician.:       I,:  Toyin Sprague MD personally performed the services described in this documentation    as scribed in my presence.:

## 2025-01-24 ENCOUNTER — APPOINTMENT (OUTPATIENT)
Dept: LAB | Age: 20
End: 2025-01-24
Payer: COMMERCIAL

## 2025-01-25 LAB
DHEA-S SERPL-MCNC: 175 UG/DL (ref 110–431.7)
TESTOST FREE SERPL-MCNC: 2.2 PG/ML (ref 0–4.2)
TESTOST SERPL-MCNC: 36 NG/DL (ref 13–71)

## 2025-01-27 ENCOUNTER — RESULTS FOLLOW-UP (OUTPATIENT)
Dept: FAMILY MEDICINE CLINIC | Facility: CLINIC | Age: 20
End: 2025-01-27

## 2025-01-27 ENCOUNTER — CONSULT (OUTPATIENT)
Dept: ENDOCRINOLOGY | Facility: CLINIC | Age: 20
End: 2025-01-27
Payer: COMMERCIAL

## 2025-01-27 VITALS
SYSTOLIC BLOOD PRESSURE: 128 MMHG | OXYGEN SATURATION: 99 % | HEIGHT: 66 IN | BODY MASS INDEX: 39.28 KG/M2 | DIASTOLIC BLOOD PRESSURE: 82 MMHG | WEIGHT: 244.4 LBS | HEART RATE: 81 BPM

## 2025-01-27 DIAGNOSIS — N91.2 AMENORRHEA: Primary | ICD-10-CM

## 2025-01-27 DIAGNOSIS — E66.812 CLASS 2 OBESITY WITHOUT SERIOUS COMORBIDITY WITH BODY MASS INDEX (BMI) OF 39.0 TO 39.9 IN ADULT, UNSPECIFIED OBESITY TYPE: ICD-10-CM

## 2025-01-27 PROCEDURE — 99244 OFF/OP CNSLTJ NEW/EST MOD 40: CPT | Performed by: INTERNAL MEDICINE

## 2025-01-27 RX ORDER — DEXAMETHASONE 1 MG
1 TABLET ORAL ONCE
Qty: 1 TABLET | Refills: 0 | Status: SHIPPED | OUTPATIENT
Start: 2025-01-27 | End: 2025-01-27

## 2025-01-27 NOTE — ASSESSMENT & PLAN NOTE
Secondary amenorrhea of unclear etiology  Patient lacks signs and symptoms, and biochemical and imaging findings suggestive of PCOS.  Given weight gain since 2022 and her amenorrhea, would be worthwhile to assess for hypercortisolism    Evaluate with dexamethasone suppression test, including morning cortisol and dexamethasone level.  Instructions on how to perform test provided in AVS. Once resulted we will be in contact patient to discuss.  If testing within normal limits, patient to follow-up with gynecology for ongoing management of secondary amenorrhea.  Follow-up will be arranged based on lab testing, and for now, does not require follow-up with endocrinology.  Orders:  •  Ambulatory Referral to Endocrinology  •  Dexamethasone; Future  •  dexamethasone (DECADRON) 1 mg tablet; Take 1 tablet (1 mg total) by mouth 1 (one) time for 1 dose Take the night prior to your Dexamethasone Suppression test (DST) at around 10 pm  •  Cortisol Level,7-9 AM Specimen; Future

## 2025-01-27 NOTE — PROGRESS NOTES
Assessment & Plan  Amenorrhea  Secondary amenorrhea of unclear etiology  Patient lacks signs and symptoms, and biochemical and imaging findings suggestive of PCOS.  Given weight gain since 2022 and her amenorrhea, would be worthwhile to assess for hypercortisolism    Evaluate with dexamethasone suppression test, including morning cortisol and dexamethasone level.  Instructions on how to perform test provided in AVS. Once resulted we will be in contact patient to discuss.  If testing within normal limits, patient to follow-up with gynecology for ongoing management of secondary amenorrhea.  Follow-up will be arranged based on lab testing, and for now, does not require follow-up with endocrinology.  Orders:  •  Ambulatory Referral to Endocrinology  •  Dexamethasone; Future  •  dexamethasone (DECADRON) 1 mg tablet; Take 1 tablet (1 mg total) by mouth 1 (one) time for 1 dose Take the night prior to your Dexamethasone Suppression test (DST) at around 10 pm  •  Cortisol Level,7-9 AM Specimen; Future    Class 2 obesity without serious comorbidity with body mass index (BMI) of 39.0 to 39.9 in adult, unspecified obesity type  Screening for hypercortisolism as above  Orders:  •  Dexamethasone; Future  •  dexamethasone (DECADRON) 1 mg tablet; Take 1 tablet (1 mg total) by mouth 1 (one) time for 1 dose Take the night prior to your Dexamethasone Suppression test (DST) at around 10 pm  •  Cortisol Level,7-9 AM Specimen; Future          HPI:   Chanell Smart -  19 y.o. female with history of secondary amenorrhea, morbid obesity, and prediabetes, that arrives to clinic for evaluation of secondary amenorrhea.     Menstrual history-menarche age 10, initially regular    Patient reports issues with menstrual cycle since 2022, during which time she did not have her period for 8 months.  At that time, reports evaluation by gynecology with lab testing and imaging, which were inconsistent with PCOS, and reports taking Provera which helped  her have breakthrough bleed.  Report stopping the Provera, had a few months of regular periods, and then around February 2023 reports once again issues with lack of menstrual periods.  Reports being reassessed by gynecology, once again took Provera pill every 3 months which helped her with her menstrual cycles.  However, currently not taking Provera.  Reports that her last menstrual period was in November 2024.    Patient denies headaches, changes in vision, acne, abnormal hair growth at face, chest, and abdomen, change in her voice, weakness, and free abdominal striations and other changes in skin chest easy bruising and thin skin.  Of note, since 2022, patient has gained at least 130 pounds.  Reports that she has not made changes to her lifestyle including diet.    Denies history of concussions  Patient has never been pregnant  Family history-mother with PCOS  Supplements with probiotics      Patient has no other complaints at this time.      Subjective:  Review of Systems   Constitutional:  Positive for unexpected weight change. Negative for appetite change, diaphoresis and fatigue.   Eyes:  Negative for photophobia and visual disturbance.   Respiratory:  Negative for chest tightness and shortness of breath.    Cardiovascular:  Negative for chest pain, palpitations and leg swelling.   Gastrointestinal:  Negative for abdominal pain, constipation, diarrhea, nausea and vomiting.   Endocrine: Negative for polydipsia, polyphagia and polyuria.   Genitourinary:  Negative for difficulty urinating, enuresis, frequency and hematuria.   Skin:  Negative for color change and wound.   Neurological:  Negative for tremors, weakness, light-headedness and headaches.        Patient Active Problem List   Diagnosis   • Myopia   • Obesity, unspecified   • Amenorrhea        Current Outpatient Medications   Medication Sig Dispense Refill   • amoxicillin (AMOXIL) 250 mg capsule Take one capsule (250 mg) three times a day for two months.  "90 capsule 1   • dexamethasone (DECADRON) 1 mg tablet Take 1 tablet (1 mg total) by mouth 1 (one) time for 1 dose Take the night prior to your Dexamethasone Suppression test (DST) at around 10 pm 1 tablet 0   • naproxen (NAPROSYN) 500 mg tablet Take 1 tablet (500 mg total) by mouth 2 (two) times a day as needed for mild pain or moderate pain 20 tablet 0   • medroxyPROGESTERone (PROVERA) 10 mg tablet Take 1 tablet (10 mg total) by mouth daily for 10 days Take if no spontaneous menstrual cycle in 3 months (Patient not taking: Reported on 1/27/2025) 10 tablet 4     No current facility-administered medications for this visit.        Allergies   Allergen Reactions   • Chocolate - Food Allergy      Breaks out in rash        The following portions of the patient's history were reviewed and updated as appropriate: allergies, current medications, past family history, past medical history, past social history, past surgical history and problem list.             Objective:  /82   Pulse 81   Ht 5' 6\" (1.676 m)   Wt 111 kg (244 lb 6.4 oz)   SpO2 99%   BMI 39.45 kg/m²      Body mass index is 39.45 kg/m².     Physical Exam  Vitals reviewed.   Constitutional:       Appearance: Normal appearance. She is morbidly obese. She is not ill-appearing or diaphoretic.   HENT:      Head: Normocephalic and atraumatic.   Eyes:      General: No scleral icterus.     Conjunctiva/sclera: Conjunctivae normal.   Cardiovascular:      Rate and Rhythm: Normal rate and regular rhythm.      Heart sounds: Normal heart sounds.   Pulmonary:      Effort: Pulmonary effort is normal. No respiratory distress.      Breath sounds: Normal breath sounds.   Abdominal:      General: Bowel sounds are normal. There is no distension.   Musculoskeletal:         General: Normal range of motion.      Cervical back: Normal range of motion and neck supple.      Right lower leg: No edema.      Left lower leg: No edema.   Skin:     General: Skin is warm and dry.      " Capillary Refill: Capillary refill takes less than 2 seconds.      Coloration: Skin is not jaundiced or pale.   Neurological:      Mental Status: She is alert and oriented to person, place, and time. Mental status is at baseline.      Motor: No weakness.   Psychiatric:         Mood and Affect: Mood normal.         Behavior: Behavior normal.          Labs:  I have personally reviewed the following labs.   Latest Reference Range & Units 01/12/24 12:48 03/18/24 09:12 01/24/25 12:21   Sodium 135 - 147 mmol/L 137     Potassium 3.5 - 5.3 mmol/L 3.9     Chloride 96 - 108 mmol/L 104     Carbon Dioxide 21 - 32 mmol/L 25     ANION GAP mmol/L 8     BUN 5 - 25 mg/dL 9     Creatinine 0.60 - 1.30 mg/dL 0.68     GLUCOSE, FASTING 65 - 99 mg/dL 82     Calcium 8.4 - 10.2 mg/dL 9.6     AST 13 - 39 U/L 19     ALT 7 - 52 U/L 15     ALK PHOS 34 - 104 U/L 74     Total Protein 6.4 - 8.4 g/dL 8.0     Albumin 3.5 - 5.0 g/dL 4.1     Total Bilirubin 0.20 - 1.00 mg/dL 0.39     GFR, Calculated ml/min/1.73sq m 127     17-OH PROGESTERONE ng/dL  28    DHEA-SO4 110.0 - 431.7 ug/dL  161.0 175.0   FSH, POC See Comment mIU/mL  7.5    PROLACTIN 3.34 - 26.72 ng/mL  11.82    Hemoglobin A1C Normal 4.0-5.6%; PreDiabetic 5.7-6.4%; Diabetic >=6.5%; Glycemic control for adults with diabetes <7.0% % 5.8 (H)     eAG, EST AVG Glucose mg/dl 120     HCG QUANTITATIVE 0 - 5 mIU/mL  1    Testosterone, Total, LC/MS 13 - 71 ng/dL  47 36   TESTOSTERONE FREE 0.0 - 4.2 pg/mL  3.3 2.2   TSH 3RD GENERATON 0.450 - 4.500 uIU/mL 2.312 2.245    FREE T4 0.61 - 1.12 ng/dL 1.06     FREE T3 2.50 - 3.90 pg/mL 3.41     THYROGLOBULIN AB 0.0 - 0.9 IU/mL  0.0 - 0.9 IU/mL <1.0  <1.0     Thyroglobulin-RANDI 1.5 - 38.5 ng/mL 7.7     THYROID MICROSOMAL ANTIBODY 0 - 26 IU/mL 12     (H): Data is abnormally high     Imaging:  I have personally reviewed the following imaging.  Narrative & Impression   PELVIC ULTRASOUND, COMPLETE  DATE: 3/28/2024     INDICATION: The patient is 18 years old. N91.2:  Amenorrhea, unspecified  N91.4: Secondary oligomenorrhea.     COMPARISON: Pelvic ultrasound 5/19/2021.     TECHNIQUE: Transabdominal pelvic ultrasound was performed in sagittal and transverse planes with a curvilinear transducer. Imaging included volumetric sweeps as well as traditional still imaging technique.     FINDINGS:     UTERUS:  The uterus is anteverted in position, measuring 7.4 x 2.2 x 3.4 cm.  The uterus has a normal contour and echotexture.  The cervix appears within normal limits.     ENDOMETRIUM:  The endometrial echo complex has an AP caliber of 3.0 mm.  Appearance within normal limits.     OVARIES/ADNEXA:  Right ovary: 3.2 x 1.5 x 1.2 cm. 3.0 mL.  Ovarian Doppler flow is within normal limits.  No suspicious ovarian or adnexal abnormality.     Left ovary: 3.9 x 1.7 x 1.4 cm. 4.6 mL.  Ovarian Doppler flow is within normal limits.  No suspicious ovarian or adnexal abnormality.     OTHER:  No free fluid or loculated fluid collections.     IMPRESSION:     Normal transabdominal appearance of the uterus and ovaries..                             Workstation performed: [id number]          Steve Mayfield MD  Endocrinology Fellow, PGY-4

## 2025-01-27 NOTE — PATIENT INSTRUCTIONS
Work-up for excess cortisol:  Dexamethasone suppression test (DST) - Take 1 tablet (1 mg total) of Dexamethasone by mouth for 1 dose the night prior at around 10-11 pm for your Dexamethasone Suppression test (DST).  The following morning, get lab done while fasting and between 7 and 9 am - including morning cortisol and dexamethasone levels.   Make an appointment for follow-up with gynecology

## 2025-01-29 ENCOUNTER — APPOINTMENT (OUTPATIENT)
Dept: LAB | Age: 20
End: 2025-01-29
Payer: COMMERCIAL

## 2025-01-29 DIAGNOSIS — N91.2 AMENORRHEA: ICD-10-CM

## 2025-01-29 DIAGNOSIS — E66.812 CLASS 2 OBESITY WITHOUT SERIOUS COMORBIDITY WITH BODY MASS INDEX (BMI) OF 39.0 TO 39.9 IN ADULT, UNSPECIFIED OBESITY TYPE: ICD-10-CM

## 2025-01-29 LAB — CORTIS AM PEAK SERPL-MCNC: 0.5 UG/DL (ref 6.7–22.6)

## 2025-01-29 PROCEDURE — 36415 COLL VENOUS BLD VENIPUNCTURE: CPT

## 2025-01-29 PROCEDURE — 80299 QUANTITATIVE ASSAY DRUG: CPT | Performed by: INTERNAL MEDICINE

## 2025-01-29 PROCEDURE — 82533 TOTAL CORTISOL: CPT

## 2025-01-31 ENCOUNTER — OFFICE VISIT (OUTPATIENT)
Dept: DERMATOLOGY | Facility: CLINIC | Age: 20
End: 2025-01-31
Payer: COMMERCIAL

## 2025-01-31 VITALS — WEIGHT: 244 LBS | TEMPERATURE: 98.2 F | BODY MASS INDEX: 39.38 KG/M2

## 2025-01-31 DIAGNOSIS — L83 CONFLUENT AND RETICULATED PAPILLOMATOSIS (CARP): Primary | ICD-10-CM

## 2025-01-31 PROCEDURE — 99213 OFFICE O/P EST LOW 20 MIN: CPT

## 2025-01-31 NOTE — PROGRESS NOTES
"[facility] Dermatology Clinic Note     Patient Name: Chanell Smart  Encounter Date: 1/31/2025     Have you been cared for by a [facility] Dermatologist in the last 3 years and, if so, which description applies to you?    Yes.  I have been here within the last 3 years, and my medical history has NOT changed since that time.  I am FEMALE/of child-bearing potential.    REVIEW OF SYSTEMS:  Have you recently had or currently have any of the following? No changes in my recent health.   PAST MEDICAL HISTORY:  Have you personally ever had or currently have any of the following?  If \"YES,\" then please provide more detail. No changes in my medical history.   HISTORY OF IMMUNOSUPPRESSION: Do you have a history of any of the following:  Systemic Immunosuppression such as Diabetes, Biologic or Immunotherapy, Chemotherapy, Organ Transplantation, Bone Marrow Transplantation or Prednisone?  No     Answering \"YES\" requires the addition of the dotphrase \"IMMUNOSUPPRESSED\" as the first diagnosis of the patient's visit.   FAMILY HISTORY:  Any \"first degree relatives\" (parent, brother, sister, or child) with the following?    No changes in my family's known health.   PATIENT EXPERIENCE:    Do you want the Dermatologist to perform a COMPLETE skin exam today including a clinical examination under the \"bra and underwear\" areas?  NO  If necessary, do we have your permission to call and leave a detailed message on your Preferred Phone number that includes your specific medical information?  Yes      Allergies   Allergen Reactions   • Chocolate - Food Allergy      Breaks out in rash      Current Outpatient Medications:   •  naproxen (NAPROSYN) 500 mg tablet, Take 1 tablet (500 mg total) by mouth 2 (two) times a day as needed for mild pain or moderate pain, Disp: 20 tablet, Rfl: 0  •  amoxicillin (AMOXIL) 250 mg capsule, Take one capsule (250 mg) three times a day for two months., Disp: 90 capsule, Rfl: 1  •  medroxyPROGESTERone (PROVERA) 10 mg " tablet, Take 1 tablet (10 mg total) by mouth daily for 10 days Take if no spontaneous menstrual cycle in 3 months (Patient not taking: Reported on 1/27/2025), Disp: 10 tablet, Rfl: 4        Whom besides the patient is providing clinical information about today's encounter?   NO ADDITIONAL HISTORIAN (patient alone provided history)    Physical Exam and Assessment/Plan by Diagnosis:    CONFLUENT AND RETICULATED PAPILLOMATOSIS (CARP)     Physical Exam:  Anatomic Location Affected:  chest; back resolved  Morphological Description: residual hyperpigmented papules/plaques on chest  Pertinent Positives:    Pertinent Negatives:     Additional History of Present Condition:  Patient presents for follow up. She was last seen in office on 12/17/2024. Patient has been taking Amoxocillin 250 mg TID. She completed 1 month. Patient reports she is doing much better. She has had no stomach upset. Rash is much better but still slightly present on chest     Assessment and Plan:  Based on a thorough discussion of this condition and the management approach to it (including a comprehensive discussion of the known risks, side effects and potential benefits of treatment), the patient (family) agrees to implement the following specific plan:  Continue Amoxicillin 250 mg TID for 1 more month  Follow up in 6-8 weeks to ensure clearance     What is confluent and reticulated papillomatosis?  Confluent and reticulated papillomatosis is an uncommon skin condition affecting the trunk, neck and axillae. It is characterised by asymptomatic, hyperpigmented papules and plaques that have a peripheral, net-like configuration. [place] dermatologists Edvin and Angela first described confluent and reticulated papillomatosis in 1927.     Who gets confluent and reticulated papillomatosis?   Confluent and reticulated papillomatosis mostly occurs in young adults. The mean age of incidence is 15 years, with a range of 8-32 years. It has been reported  worldwide in all racial groups and ethnicities, but it is reported to be most common in Caucasians.     What is the cause of confluent and reticulated papillomatosis?  What triggers confluent and reticulated papillomatosis remains unclear. Disordered and hyperproliferative keratinisation has been observed on light and electron microscopy. The possibility that this is due to skin infection is supported by the success of treatment with antibiotics.  Dietzia papillomatosis is the current leading infectious candidate. This is a gram-positive actinomycete that was first isolated from a patient with confluent and reticulated papillomatosis in .   Earlier observations that antifungal agents were effective, supporting the role of Malassezia furfur in the pathogenesis, were likely due to misdiagnosis of pityriasis versicolor.  Genetic factors may also be involved.  Several cases of familial confluent and reticulated papillomatosis have been reported.   Mutation of the gene for the protein keratin-16 (K-16) has been found in some cases.  Insulin resistance, which causes diabetes mellitus and polycystic ovarian syndrome, has been suggested as a contributing factor to confluent and reticulated papillomatosis, but evidence is weak, as most affected patients do not have metabolic syndrome. Amyloidosis and reaction to ultraviolet light have also been considered but are unlikely causes.     What are the clinical features of confluent and reticulated papillomatosis?  Confluent and reticulated papillomatosis is characterised by multiple 1-5 mm, hyperpigmented, scaly macules or papillomatous papules.  These often form confluent patches or plaques centrally, and a reticular pattern peripherally.   They most commonly occur on the upper trunk, neck and axillae. They may also extend anteriorly down to the pubic region and posteriorly to the  cleft. The antecubital and popliteal fossae and forehead are rarely affected.   The  eruption does not involve mucosal surfaces or nails.   Lesions are asymptomatic or mildly itchy.      What is the treatment for confluent and reticulated papillomatosis?  Confluent and reticulated papillomatosis usually clears with a tetracycline (minocycline, doxycycline for 6-12 weeks) or a macrolide antibiotic (azithromycin, clarithromycin, erythromycin for 4-6 weeks). Azithromycin and erythromycin can be prescribed in pregnancy. Topical tazarotene (a topical retinoid), tacrolimus and calcipotriol) may be used for localised disease. Systematic retinoids (isotretinoin and acitretin) are usually reserved for cases refractory to antibiotics and topical agents.     What is the outlook for confluent and reticulated papillomatosis?  A single course of minocycline or a macrolide antibiotic is reported to lead to remission for up to 2 years in many cases of confluent and reticulated papillomatosis. Recurrence in up to 15% of cases usually follows non-antibacterial treatment. Spontaneous resolution has been reported in a few cases but it may take up to 39 months for the eruption to clear up.         Scribe Attestation    I,:  Mellissa Hall MA am acting as a scribe while in the presence of the attending physician.:       I,:  Joan Paredes PA-C personally performed the services described in this documentation    as scribed in my presence.:

## 2025-02-03 LAB — DEXAMETHASONE SERPL-MCNC: 386 NG/DL

## 2025-02-07 ENCOUNTER — RESULTS FOLLOW-UP (OUTPATIENT)
Dept: ENDOCRINOLOGY | Facility: CLINIC | Age: 20
End: 2025-02-07

## 2025-03-21 ENCOUNTER — OFFICE VISIT (OUTPATIENT)
Dept: DERMATOLOGY | Facility: CLINIC | Age: 20
End: 2025-03-21
Payer: COMMERCIAL

## 2025-03-21 VITALS — BODY MASS INDEX: 39.71 KG/M2 | TEMPERATURE: 97 F | WEIGHT: 246 LBS

## 2025-03-21 DIAGNOSIS — L70.0 ACNE VULGARIS: ICD-10-CM

## 2025-03-21 DIAGNOSIS — L83 CONFLUENT AND RETICULATED PAPILLOMATOSIS (CARP): Primary | ICD-10-CM

## 2025-03-21 DIAGNOSIS — L83 ACANTHOSIS NIGRICANS: ICD-10-CM

## 2025-03-21 PROCEDURE — 99214 OFFICE O/P EST MOD 30 MIN: CPT

## 2025-03-21 RX ORDER — TRETINOIN 0.25 MG/G
CREAM TOPICAL
Qty: 45 G | Refills: 4 | Status: SHIPPED | OUTPATIENT
Start: 2025-03-21

## 2025-03-21 RX ORDER — DEXAMETHASONE 1 MG
1 TABLET ORAL
COMMUNITY
Start: 2025-01-27

## 2025-03-21 NOTE — PROGRESS NOTES
"[facility] Dermatology Clinic Note     Patient Name: Chanell Smart  Encounter Date: 3/21/25     Have you been cared for by a [facility] Dermatologist in the last 3 years and, if so, which description applies to you?    Yes.  I have been here within the last 3 years, and my medical history has NOT changed since that time.  I am FEMALE/of child-bearing potential.    REVIEW OF SYSTEMS:  Have you recently had or currently have any of the following? No changes in my recent health.   PAST MEDICAL HISTORY:  Have you personally ever had or currently have any of the following?  If \"YES,\" then please provide more detail. No changes in my medical history.   HISTORY OF IMMUNOSUPPRESSION: Do you have a history of any of the following:  Systemic Immunosuppression such as Diabetes, Biologic or Immunotherapy, Chemotherapy, Organ Transplantation, Bone Marrow Transplantation or Prednisone?  No     Answering \"YES\" requires the addition of the dotphrase \"IMMUNOSUPPRESSED\" as the first diagnosis of the patient's visit.   FAMILY HISTORY:  Any \"first degree relatives\" (parent, brother, sister, or child) with the following?    No changes in my family's known health.   PATIENT EXPERIENCE:    Do you want the Dermatologist to perform a COMPLETE skin exam today including a clinical examination under the \"bra and underwear\" areas?  NO  If necessary, do we have your permission to call and leave a detailed message on your Preferred Phone number that includes your specific medical information?  Yes      Allergies   Allergen Reactions    Chocolate - Food Allergy      Breaks out in rash      Current Outpatient Medications:     medroxyPROGESTERone (PROVERA) 10 mg tablet, Take 1 tablet (10 mg total) by mouth daily for 10 days Take if no spontaneous menstrual cycle in 3 months (Patient not taking: Reported on 1/27/2025), Disp: 10 tablet, Rfl: 4    naproxen (NAPROSYN) 500 mg tablet, Take 1 tablet (500 mg total) by mouth 2 (two) times a day as needed for " mild pain or moderate pain, Disp: 20 tablet, Rfl: 0        Whom besides the patient is providing clinical information about today's encounter?   NO ADDITIONAL HISTORIAN (patient alone provided history)    Physical Exam and Assessment/Plan by Diagnosis:    CONFLUENT AND RETICULATED PAPILLOMATOSIS (CARP) (RESOLVED)      Physical Exam:  Anatomic Location Affected:  chest; back resolved  Morphological Description: residual hyperpigmented papules/plaques on chest       Additional History of Present Condition:  Patient presents for follow up. She was last seen in office on 1/31/2024. Patient has been taking Amoxocillin 250 mg TID. She completed 2 months of this. Patient reports she is doing much better. She has had no stomach upset. Rash has resolved but she notes some hyperpigmentation seen. She denies any itchiness or other symptoms of the area.     Assessment and Plan:  Based on a thorough discussion of this condition and the management approach to it (including a comprehensive discussion of the known risks, side effects and potential benefits of treatment), the patient (family) agrees to implement the following specific plan:  Resolved.      ACNE VULGARIS    Physical Exam:  Anatomic Location Affected:  face  Morphological Description: Scattered open/closed comedones    Additional History of Present Condition:  noted on exam. Patient is not using anything for her acne.     Discussed that treatment is directed at improving skin appearance and reducing the likelihood of scarring. Discussed theraputic ladder including topical OTC treatments, topical prescriptions, and oral medications. Discussed side effects as noted below.     Plan today:     AM:   -Gentle cleanser, such as CeraVe, Cetaphil or La Roche Posay  -Moisturizer with built in SPF.     PM:  - Gentle cleanser, such as CeraVe, Cetaphil or La Roche Posay  - Start Tretinoin 0.025% qHS. Educated that this medication can be drying and irritating. Start by applying a  pea-sized amount of product one night a  week, then increase to nightly as tolerated.   - non-comedogenic moisturizer such as CeraVe, Cetaphil or Vanicream. Can be used on top of retinoid.    Call with any questions or concerns before next follow-up visit; do not stop medications abruptly without consulting provider.      ACANTHOSIS NIGRICANS   Physical Exam:  Anatomic Location Affected:  central upper chest   Morphological Description:  hyperpigmented velvety plaques       Additional History of Present Condition:  Patient was seen by endocrinology and work up was negative.     Assessment and Plan:  Based on a thorough discussion of this condition and the management approach to it (including a comprehensive discussion of the known risks, side effects and potential benefits of treatment), the patient (family) agrees to implement the following specific plan:  Start tretinoin 0.025% cream- apply a thin later to affected area on chest 3 nights a week and advance to nightly. Can combine with a moisturizer if irritation/dryness develops.   If not covered by  insurance, can try OTC Adapalene 0.1% gel.     Scribe Attestation      I,:  Cayla Ocasio MA am acting as a scribe while in the presence of the attending physician.:       I,:  Pretty Hardin PA-C personally performed the services described in this documentation    as scribed in my presence.:

## 2025-03-24 ENCOUNTER — OFFICE VISIT (OUTPATIENT)
Dept: FAMILY MEDICINE CLINIC | Facility: CLINIC | Age: 20
End: 2025-03-24
Payer: COMMERCIAL

## 2025-03-24 VITALS
HEART RATE: 87 BPM | SYSTOLIC BLOOD PRESSURE: 128 MMHG | OXYGEN SATURATION: 98 % | DIASTOLIC BLOOD PRESSURE: 72 MMHG | HEIGHT: 66 IN | WEIGHT: 246.4 LBS | BODY MASS INDEX: 39.6 KG/M2 | TEMPERATURE: 98.2 F

## 2025-03-24 DIAGNOSIS — Z00.00 WELL ADULT EXAM: Primary | ICD-10-CM

## 2025-03-24 PROCEDURE — 99395 PREV VISIT EST AGE 18-39: CPT | Performed by: FAMILY MEDICINE

## 2025-04-03 ENCOUNTER — OFFICE VISIT (OUTPATIENT)
Dept: OBGYN CLINIC | Facility: CLINIC | Age: 20
End: 2025-04-03

## 2025-04-03 VITALS
HEIGHT: 66 IN | WEIGHT: 247.8 LBS | SYSTOLIC BLOOD PRESSURE: 114 MMHG | HEART RATE: 98 BPM | OXYGEN SATURATION: 100 % | BODY MASS INDEX: 39.82 KG/M2 | DIASTOLIC BLOOD PRESSURE: 72 MMHG

## 2025-04-03 DIAGNOSIS — N91.2 AMENORRHEA: ICD-10-CM

## 2025-04-03 DIAGNOSIS — N97.0 ANOVULATION: ICD-10-CM

## 2025-04-03 DIAGNOSIS — N91.4 SECONDARY OLIGOMENORRHEA: Primary | ICD-10-CM

## 2025-04-03 RX ORDER — MEDROXYPROGESTERONE ACETATE 10 MG
10 TABLET ORAL DAILY
Qty: 10 TABLET | Refills: 4 | Status: SHIPPED | OUTPATIENT
Start: 2025-04-03 | End: 2025-04-13

## 2025-04-03 NOTE — PROGRESS NOTES
Assessment/Plan:  Problem List Items Addressed This Visit          Obstetrics/Gynecology    Amenorrhea    Relevant Medications    medroxyPROGESTERone (PROVERA) 10 mg tablet     Other Visit Diagnoses         Secondary oligomenorrhea    -  Primary      Anovulation               Patient still not sexually active.  Patient has not needed to use Provera frequently as she has been getting her menstrual cycles at least every 3 months.  She was advised to have this medication to use as needed if no menses every 3 months to avoid prolonged episodes of amenorrhea.  She is not in need for contraception at this time.  I discussed need for cervical cancer screening at the age of 20.  I offered for patient to return in 7 months versus in 1 year and she wishes to return in 1 year.  We will perform annual GYN exam, cervical cytology screening if she consents and review of her medication.  Continue menstrual calendar/tracking for now.        Subjective   Patient ID: Chanell Smart is a 19 y.o. female.    Patient is here for a problem visit.    Chief Complaint   Patient presents with    Follow-up     Medication check.  No concerns      Patient was seen 1 year ago due to oligomenorrhea.  Patient was started on cyclic progesterone withdrawal.  Patient with anovulatory cycles.  Patient has not been sexually active.  She does not have a clear diagnosis of PCOS.  She states she has a period every 3 months, only had to take in March.      Menstrual History:  OB History          0    Para   0    Term   0       0    AB   0    Living   0         SAB   0    IAB   0    Ectopic   0    Multiple   0    Live Births   0                Menarche age: 10  Patient's last menstrual period was 2025 (approximate).         Past Medical History:   Diagnosis Date    Acne        No past surgical history on file.    Social History     Tobacco Use    Smoking status: Never    Smokeless tobacco: Never   Vaping Use    Vaping status: Never Used  "  Substance Use Topics    Alcohol use: No    Drug use: No        Allergies   Allergen Reactions    Chocolate - Food Allergy      Breaks out in rash         Current Outpatient Medications:     medroxyPROGESTERone (PROVERA) 10 mg tablet, Take 1 tablet (10 mg total) by mouth daily for 10 days Take if no spontaneous menstrual cycle in 3 months, Disp: 10 tablet, Rfl: 4    dexamethasone (DECADRON) 1 mg tablet, Take 1 mg by mouth (Patient not taking: Reported on 4/3/2025), Disp: , Rfl:     naproxen (NAPROSYN) 500 mg tablet, Take 1 tablet (500 mg total) by mouth 2 (two) times a day as needed for mild pain or moderate pain (Patient not taking: Reported on 3/24/2025), Disp: 20 tablet, Rfl: 0    tretinoin (RETIN-A) 0.025 % cream, Apply a pea-sized amount evenly to the entire face one hour before bedtime. Start by applying one night a week, advance to nightly as tolerated. Avoid eyes and mouth. Apply to area on upper chest three days a week and advance to nightly as tolerated. (Patient not taking: Reported on 4/3/2025), Disp: 45 g, Rfl: 4      Pertinent laboratory testing, imaging studies and prior external records reviewed    Review of Systems   Constitutional: Negative.    HENT: Negative.     Eyes: Negative.    Respiratory: Negative.     Cardiovascular: Negative.    Gastrointestinal: Negative.    Endocrine: Negative.    Genitourinary:         As noted in HPI   Musculoskeletal: Negative.    Skin: Negative.    Allergic/Immunologic: Negative.    Neurological: Negative.    Hematological: Negative.    Psychiatric/Behavioral: Negative.           /72 (BP Location: Right arm, Patient Position: Sitting, Cuff Size: Standard)   Pulse 98   Ht 5' 6\" (1.676 m)   Wt 112 kg (247 lb 12.8 oz)   LMP 03/20/2025 (Approximate)   SpO2 100%   BMI 40.00 kg/m²       Physical Exam  Constitutional:       General: She is not in acute distress.     Appearance: She is well-developed.   Cardiovascular:      Rate and Rhythm: Normal rate.      " Pulses: Normal pulses.   Neurological:      Mental Status: She is alert and oriented to person, place, and time.   Skin:     General: Skin is warm and dry.   Psychiatric:         Attention and Perception: Attention normal.         Mood and Affect: Mood normal.         Speech: Speech normal.         Behavior: Behavior normal. Behavior is cooperative.             No future appointments.

## 2025-07-29 DIAGNOSIS — N91.2 AMENORRHEA: ICD-10-CM

## 2025-07-30 RX ORDER — MEDROXYPROGESTERONE ACETATE 10 MG
10 TABLET ORAL DAILY
Qty: 10 TABLET | Refills: 0 | Status: SHIPPED | OUTPATIENT
Start: 2025-07-30 | End: 2025-08-09